# Patient Record
Sex: MALE | Race: WHITE | Employment: FULL TIME
[De-identification: names, ages, dates, MRNs, and addresses within clinical notes are randomized per-mention and may not be internally consistent; named-entity substitution may affect disease eponyms.]

---

## 2019-10-01 ENCOUNTER — HEALTH MAINTENANCE LETTER (OUTPATIENT)
Age: 54
End: 2019-10-01

## 2020-11-20 ENCOUNTER — APPOINTMENT (OUTPATIENT)
Dept: ULTRASOUND IMAGING | Facility: CLINIC | Age: 55
End: 2020-11-20
Attending: EMERGENCY MEDICINE
Payer: COMMERCIAL

## 2020-11-20 ENCOUNTER — APPOINTMENT (OUTPATIENT)
Dept: CT IMAGING | Facility: CLINIC | Age: 55
End: 2020-11-20
Attending: EMERGENCY MEDICINE
Payer: COMMERCIAL

## 2020-11-20 ENCOUNTER — HOSPITAL ENCOUNTER (EMERGENCY)
Facility: CLINIC | Age: 55
Discharge: HOME | End: 2020-11-20
Attending: EMERGENCY MEDICINE | Admitting: EMERGENCY MEDICINE
Payer: COMMERCIAL

## 2020-11-20 VITALS
HEART RATE: 98 BPM | OXYGEN SATURATION: 96 % | TEMPERATURE: 98.6 F | RESPIRATION RATE: 16 BRPM | DIASTOLIC BLOOD PRESSURE: 97 MMHG | SYSTOLIC BLOOD PRESSURE: 149 MMHG

## 2020-11-20 DIAGNOSIS — N49.2 CELLULITIS OF SCROTUM: ICD-10-CM

## 2020-11-20 DIAGNOSIS — K80.20 CALCULUS OF GALLBLADDER WITHOUT CHOLECYSTITIS WITHOUT OBSTRUCTION: ICD-10-CM

## 2020-11-20 LAB
ANION GAP SERPL CALCULATED.3IONS-SCNC: 7 MMOL/L (ref 3–14)
BASE DEFICIT BLDV-SCNC: 1 MMOL/L
BASOPHILS # BLD AUTO: 0 10E9/L (ref 0–0.2)
BASOPHILS NFR BLD AUTO: 0.1 %
BUN SERPL-MCNC: 16 MG/DL (ref 7–30)
CALCIUM SERPL-MCNC: 8.6 MG/DL (ref 8.5–10.1)
CHLORIDE SERPL-SCNC: 106 MMOL/L (ref 94–109)
CO2 SERPL-SCNC: 24 MMOL/L (ref 20–32)
CREAT SERPL-MCNC: 1.08 MG/DL (ref 0.66–1.25)
DIFFERENTIAL METHOD BLD: ABNORMAL
EOSINOPHIL # BLD AUTO: 0.1 10E9/L (ref 0–0.7)
EOSINOPHIL NFR BLD AUTO: 0.7 %
ERYTHROCYTE [DISTWIDTH] IN BLOOD BY AUTOMATED COUNT: 13 % (ref 10–15)
GFR SERPL CREATININE-BSD FRML MDRD: 77 ML/MIN/{1.73_M2}
GLUCOSE SERPL-MCNC: 97 MG/DL (ref 70–99)
HCO3 BLDV-SCNC: 23 MMOL/L (ref 21–28)
HCT VFR BLD AUTO: 45.7 % (ref 40–53)
HGB BLD-MCNC: 15.2 G/DL (ref 13.3–17.7)
IMM GRANULOCYTES # BLD: 0 10E9/L (ref 0–0.4)
IMM GRANULOCYTES NFR BLD: 0.4 %
LACTATE BLD-SCNC: 1.1 MMOL/L (ref 0.7–2)
LYMPHOCYTES # BLD AUTO: 0.7 10E9/L (ref 0.8–5.3)
LYMPHOCYTES NFR BLD AUTO: 7.6 %
MCH RBC QN AUTO: 30 PG (ref 26.5–33)
MCHC RBC AUTO-ENTMCNC: 33.3 G/DL (ref 31.5–36.5)
MCV RBC AUTO: 90 FL (ref 78–100)
MONOCYTES # BLD AUTO: 0.7 10E9/L (ref 0–1.3)
MONOCYTES NFR BLD AUTO: 8.6 %
NEUTROPHILS # BLD AUTO: 7 10E9/L (ref 1.6–8.3)
NEUTROPHILS NFR BLD AUTO: 82.6 %
NRBC # BLD AUTO: 0 10*3/UL
NRBC BLD AUTO-RTO: 0 /100
O2/TOTAL GAS SETTING VFR VENT: 0 %
PCO2 BLDV: 36 MM HG (ref 40–50)
PH BLDV: 7.42 PH (ref 7.32–7.43)
PLATELET # BLD AUTO: 107 10E9/L (ref 150–450)
PO2 BLDV: 40 MM HG (ref 25–47)
POTASSIUM SERPL-SCNC: 3.7 MMOL/L (ref 3.4–5.3)
RBC # BLD AUTO: 5.06 10E12/L (ref 4.4–5.9)
SODIUM SERPL-SCNC: 137 MMOL/L (ref 133–144)
WBC # BLD AUTO: 8.5 10E9/L (ref 4–11)

## 2020-11-20 PROCEDURE — 250N000011 HC RX IP 250 OP 636: Performed by: EMERGENCY MEDICINE

## 2020-11-20 PROCEDURE — 80048 BASIC METABOLIC PNL TOTAL CA: CPT | Performed by: EMERGENCY MEDICINE

## 2020-11-20 PROCEDURE — 99285 EMERGENCY DEPT VISIT HI MDM: CPT | Mod: 25

## 2020-11-20 PROCEDURE — 82803 BLOOD GASES ANY COMBINATION: CPT | Performed by: EMERGENCY MEDICINE

## 2020-11-20 PROCEDURE — 76870 US EXAM SCROTUM: CPT

## 2020-11-20 PROCEDURE — 83605 ASSAY OF LACTIC ACID: CPT | Performed by: EMERGENCY MEDICINE

## 2020-11-20 PROCEDURE — 96374 THER/PROPH/DIAG INJ IV PUSH: CPT | Mod: 59

## 2020-11-20 PROCEDURE — 85025 COMPLETE CBC W/AUTO DIFF WBC: CPT | Performed by: EMERGENCY MEDICINE

## 2020-11-20 PROCEDURE — 74177 CT ABD & PELVIS W/CONTRAST: CPT

## 2020-11-20 RX ORDER — KETOROLAC TROMETHAMINE 15 MG/ML
15 INJECTION, SOLUTION INTRAMUSCULAR; INTRAVENOUS ONCE
Status: COMPLETED | OUTPATIENT
Start: 2020-11-20 | End: 2020-11-20

## 2020-11-20 RX ORDER — CEPHALEXIN 500 MG/1
500 CAPSULE ORAL 4 TIMES DAILY
Qty: 40 CAPSULE | Refills: 0 | Status: SHIPPED | OUTPATIENT
Start: 2020-11-20 | End: 2020-11-30

## 2020-11-20 RX ORDER — IOPAMIDOL 755 MG/ML
500 INJECTION, SOLUTION INTRAVASCULAR ONCE
Status: COMPLETED | OUTPATIENT
Start: 2020-11-20 | End: 2020-11-20

## 2020-11-20 RX ADMIN — IOPAMIDOL 100 ML: 755 INJECTION, SOLUTION INTRAVENOUS at 14:45

## 2020-11-20 RX ADMIN — KETOROLAC TROMETHAMINE 15 MG: 15 INJECTION, SOLUTION INTRAMUSCULAR; INTRAVENOUS at 13:45

## 2020-11-20 ASSESSMENT — ENCOUNTER SYMPTOMS
HEMATURIA: 0
ABDOMINAL PAIN: 0
NAUSEA: 0
DYSURIA: 0
VOMITING: 0

## 2020-11-20 NOTE — ED AVS SNAPSHOT
[facility]  [address]  [facility] [address]  Phone: [phone]  Fax: [phone]                                    Kushal Moore   MRN: [id number]    Department: [facility] Emergency [facility]   Date of Visit: 11/20/2020           After Visit Summary Signature Page    I have received my discharge instructions, and my questions have been answered. I have discussed any challenges I see with this plan with the nurse or doctor.    ..........................................................................................................................................  Patient/Patient Representative Signature      ..........................................................................................................................................  Patient Representative Print Name and Relationship to Patient    ..................................................               ................................................  Date                                   Time    ..........................................................................................................................................  Reviewed by Signature/Title    ...................................................              ..............................................  Date                                               Time          22EPIC Rev 08/18

## 2020-11-20 NOTE — ED PROVIDER NOTES
History   Chief Complaint:  Groin Pain and Swelling    The history is provided by the patient.     Kushal Moore is a 55 year old male with a history of hypertension who presents for evaluation of groin pain and swelling. The patient reports he was showering yesterday after doing some yard work and heavy lifting when he noticed swelling in his right groin around his testicle. He states the swelling and pain worsened this morning while he was at work which prompted his visit to [facility], who advised he be seen here. The patient states he is worried about a swollen lymph node or abscess. He denies any injury to the area. He also denies any nausea, vomiting, dysuria, hematuria, or abdominal pain. No history of UTI or kidney stones. No prior abdominal surgery.    Allergies:  No Known Drug Allergies    Medications:    Fexofenadine HCL    Past Medical History:    Hypertension  Depressive disorder    Past Surgical History:    The patient does not have any pertinent past surgical history.     Family History:    Hypertension  Depression  Anxiety     Social History:  Marital Status: Unknown  Tobacco use: Never smoker  Alcohol use: Yes; occasional  Drug use: No    Review of Systems   Gastrointestinal: Negative for abdominal pain, nausea and vomiting.   Genitourinary: Negative for dysuria and hematuria.        Groin pain and swelling   All other systems reviewed and are negative.    Physical Exam     Patient Vitals for the past 24 hrs:   BP Temp Pulse Resp SpO2   11/20/20 1600 (!) 149/97 -- 98 -- 96 %   11/20/20 1308 (!) 153/110 98.6  F (37  C) 110 16 98 %       Physical Exam  Constitutional: Vital signs reviewed as above.   HENT:               Head: No external signs of trauma noted.              Eyes: Conjunctivae are normal. Pupils are equal, round, and reactive to light.   Cardiovascular:               Normal rate, regular rhythm and normal heart sounds.                Exam reveals no friction rub.                 No murmur heard.  Pulmonary/Chest:               Effort normal and breath sounds normal.               No respiratory distress.               There are no wheezes.               There are no rales.   Gastrointestinal:               Soft.               Bowel sounds normal.               There is no distension.               There is no abdominal tenderness on my exam.               There is no rebound or guarding.   :              Normal appearance of external male genitalia              No penile discharge              Circumcised              Left testicle appears non-swollen. No left testicular tenderness. No testicular masses palpated              Right testicle appears non-swollen. No right testicular tenderness. No testicular masses palpated.              There is right sided scrotal induration and some erythema surrounding a central area of slightly darker erythema is present, but no specific fluctuance. The erythema seems to track both towards the perineum as well as superiorly towards the base of the penis.  Musculoskeletal:               Normal range of motion.               Normal Tone  Neurological: Patient is alert and oriented to person, place, and time.   Skin: There is right sided scrotal induration and some erythema surrounding a central area of slightly darker erythema is present, but no specific fluctuance. The erythema seems to track both towards the perineum as well as superiorly towards the base of the penis.  Psychiatric: The patient appears calm    Emergency Department Course     Imaging:  Radiology findings were communicated with the patient who voiced understanding of the findings.    CT Abdomen Pelvis w/ IV contrast:   1. Probable cholelithiasis. Recommend ultrasound correlation if   clinically indicated.   2. Mild splenomegaly.   3. Vascular calcifications, including unusually prominent testicular   artery calcifications bilaterally.   4. Very small fat-containing left inguinal  hernia.   5. Nonspecific subcutaneous edema in the visualized right scrotum, as per radiology.    US Testicular & Scrotum w/ Doppler Ltd:  No evidence for scrotal abscess, as per radiology.     Laboratory:  Laboratory findings were communicated with the patient who voiced understanding of the findings.    CBC: WBC: 8.5, HGB: 15.2, PLT: 107 (L)    BMP: WNL (Creatinine: 1.08)    Lactic Acid (Resulted 1409): 1.1    Blood gas venous: PCO2 36 (L), o/w WNL     Interventions:  1345: Toradol, 15 mg, IV    Emergency Department Course:  Past medical records, nursing notes, and vitals reviewed.    ED Course as of Nov 20 2021 Fri Nov 20, 2020   1332 Evaluated the patient      1601 I spoke with Dr. Wyman of the Radiology service regarding patient's CT scan.       1604 Updated the patient on results and plan of care. Will get US.      1815 Updated patient. He is comfortable with discharge at this time.         Findings and plan explained to the Patient. Patient discharged home with instructions regarding supportive care, medications, and reasons to return. The importance of close follow-up was reviewed. The patient was prescribed Keflex.    I personally reviewed the laboratory and imaging results with the Patient and answered all related questions prior to discharge.     Impression & Plan      Medical Decision Making:  This 55-year-old male patient presents the ED due to groin pain.  Please see the HPI and exam for specifics.  The patient specific location of discomfort was his right scrotum.  There was induration that I could palpate but fortunately no abscess was noted on imaging.  I do not see any specific signs of Lili's gangrene at this point and the patient's blood work is also reassuring.  Cholelithiasis was noted on the patient's CT scan but no right upper quadrant abdominal discomfort was noted.  Based on the CT and ultrasound results, I believe the patient can be discharged.  I sent him home with a prescription  for Keflex and close return follow-up instructions.  He should monitor the area and if there is any worsening of symptoms he should return to the ED immediately for reevaluation.  Anticipatory guidance given prior to discharge.      Diagnosis:    ICD-10-CM    1. Cellulitis of scrotum  N49.2    2. Calculus of gallbladder without cholecystitis without obstruction  K80.20        Disposition:  Discharged to home.    Discharge Medications:  Discharge Medication List as of 11/20/2020  6:23 PM      START taking these medications    Details   cephALEXin (KEFLEX) 500 MG capsule Take 1 capsule (500 mg) by mouth 4 times daily for 10 days, Disp-40 capsule, R-0, E-Prescribe             Scribe Disclosure:  I, Indu Lopez, am serving as a scribe at 1:32 PM on 11/20/2020 to document services personally performed by Chandler Bryant DO based on my observations and the provider's statements to me.      Chandler Bryant DO  11/20/20 2021

## 2020-11-20 NOTE — ED TRIAGE NOTES
Pt g/o swelling in his testicles since doing yard work yesterday.  Pain has been worse since early this morning.

## 2020-11-20 NOTE — LETTER
November 20, 2020      To Whom It May Concern:      Kushal Moore was seen in our Emergency Department today, 11/20/20.  I expect his condition to improve over the next 2-3 days.  As long as he feels well, he is able to go to work.  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    Sincerely,            Chandler Bryant, DO

## 2020-11-21 NOTE — DISCHARGE INSTRUCTIONS
Diagnosis: Cellulitis of scrotum  What do you do next:   Continue your home medications unless we have specifically changed them  I have prescribed an antibiotic called Keflex.  Please take this as directed.  Please begin taking it today.  Follow up as indicated below    When do you return: If you have intractable pain spreading redness, foul-smelling drainage, controllable fevers or any other symptoms that concern you, please return to the ED for reevaluation.    Thank you for allowing us to care for you today.

## 2020-11-23 ENCOUNTER — NURSE TRIAGE (OUTPATIENT)
Dept: NURSING | Facility: CLINIC | Age: 55
End: 2020-11-23

## 2020-11-23 NOTE — TELEPHONE ENCOUNTER
IN ED on Friday. Diagnosis of cellulitis.  Swollen scrotum into the thigh.    Today patient is having whitish bloody pus down in the scrotum. Taking cephalexin antibiotics every 6 hours. NO spreading redness. NO red streaking. He feels like the swelling is worse. Advil at 12:15 NO known fever. Advised for a re-evaluation now advised started with Urgent Care. He stated he will go to the [facility] now.       Additional Information    Negative: Bright red, wide-spread, sunburn-like rash    Negative: Black (necrotic) or blisters develop in wound    Negative: Looks infected (spreading redness, red streak, pus) and fever    Negative: Patient sounds very sick or weak to the triager    Negative: Stitches and not infected    Negative: Severe pain in the wound    Negative: Red streak runs from the wound    Negative: Facial wound looks infected (spreading redness)    Negative: Finger wound and entire finger swollen    Pus or cloudy fluid draining from wound    Negative: Skin redness around the wound larger than 2 inches (5 cm)    Taking antibiotic > 72 hours (3 days) and infected wound not improved (pain, pus, redness)    Negative: Taking antibiotic > 48 hours and fever persists    Protocols used: WOUND INFECTION-A-OH

## 2021-01-15 ENCOUNTER — HEALTH MAINTENANCE LETTER (OUTPATIENT)
Age: 56
End: 2021-01-15

## 2021-09-04 ENCOUNTER — HEALTH MAINTENANCE LETTER (OUTPATIENT)
Age: 56
End: 2021-09-04

## 2022-02-19 ENCOUNTER — HEALTH MAINTENANCE LETTER (OUTPATIENT)
Age: 57
End: 2022-02-19

## 2022-10-16 ENCOUNTER — HEALTH MAINTENANCE LETTER (OUTPATIENT)
Age: 57
End: 2022-10-16

## 2023-04-01 ENCOUNTER — HEALTH MAINTENANCE LETTER (OUTPATIENT)
Age: 58
End: 2023-04-01